# Patient Record
Sex: FEMALE | Race: WHITE | URBAN - METROPOLITAN AREA
[De-identification: names, ages, dates, MRNs, and addresses within clinical notes are randomized per-mention and may not be internally consistent; named-entity substitution may affect disease eponyms.]

---

## 2022-02-16 ENCOUNTER — OFFICE VISIT (OUTPATIENT)
Dept: URBAN - METROPOLITAN AREA CLINIC 51 | Facility: CLINIC | Age: 67
End: 2022-02-16
Payer: COMMERCIAL

## 2022-02-16 DIAGNOSIS — H43.313 VITREOUS MEMBRANES AND STRANDS, BILATERAL: ICD-10-CM

## 2022-02-16 DIAGNOSIS — H25.813 COMBINED FORMS OF AGE-RELATED CATARACT, BILATERAL: Primary | ICD-10-CM

## 2022-02-16 DIAGNOSIS — H52.4 PRESBYOPIA: ICD-10-CM

## 2022-02-16 PROCEDURE — 92134 CPTRZ OPH DX IMG PST SGM RTA: CPT | Performed by: OPTOMETRIST

## 2022-02-16 PROCEDURE — 92004 COMPRE OPH EXAM NEW PT 1/>: CPT | Performed by: OPTOMETRIST

## 2022-02-16 ASSESSMENT — VISUAL ACUITY
OS: 20/25
OD: 20/25

## 2022-02-16 ASSESSMENT — INTRAOCULAR PRESSURE
OS: 19
OD: 17

## 2022-02-16 ASSESSMENT — KERATOMETRY
OD: 44.19
OS: 43.95

## 2022-02-16 NOTE — IMPRESSION/PLAN
Impression: Presbyopia: H52.4. Plan: Discussed change in prescription, patient prefers.  Release new SRx per patient request.

## 2022-02-16 NOTE — IMPRESSION/PLAN
Impression: Combined forms of age-related cataract, bilateral: H25.813. Plan: Educated patient on condition and findings. Cataracts may be slightly effecting vision, but are not bothersome enough to warrant cataract surgery. Patient will monitor vision closely and contact our office with any changes/ worsening in vision. Monitor.